# Patient Record
Sex: FEMALE | Race: WHITE | NOT HISPANIC OR LATINO | Employment: STUDENT | URBAN - METROPOLITAN AREA
[De-identification: names, ages, dates, MRNs, and addresses within clinical notes are randomized per-mention and may not be internally consistent; named-entity substitution may affect disease eponyms.]

---

## 2018-10-18 ENCOUNTER — HOSPITAL ENCOUNTER (EMERGENCY)
Facility: HOSPITAL | Age: 23
Discharge: HOME | End: 2018-10-18
Attending: EMERGENCY MEDICINE
Payer: COMMERCIAL

## 2018-10-18 VITALS
SYSTOLIC BLOOD PRESSURE: 119 MMHG | RESPIRATION RATE: 18 BRPM | DIASTOLIC BLOOD PRESSURE: 78 MMHG | TEMPERATURE: 98 F | OXYGEN SATURATION: 96 % | WEIGHT: 139 LBS | HEART RATE: 78 BPM

## 2018-10-18 DIAGNOSIS — H72.91 TYMPANIC MEMBRANE RUPTURE, RIGHT: Primary | ICD-10-CM

## 2018-10-18 PROCEDURE — 99283 EMERGENCY DEPT VISIT LOW MDM: CPT | Mod: ,,, | Performed by: EMERGENCY MEDICINE

## 2018-10-18 PROCEDURE — 99282 EMERGENCY DEPT VISIT SF MDM: CPT

## 2018-10-18 NOTE — DISCHARGE INSTRUCTIONS
Follow-up with ENT doctor in 5-7 days.  Keep a cotton ball in the ear to keep it clean and protect the inner ear.  Do not get water in your ear when showering or bathing. No swimming until further evaluation by your PCP/ENT.  Take ibuprofen/Tylenol as needed for pain. Return to ED immediately if worsening of pain, with bloody or foul smelling drainage from the affected side, fever, headache with nausea vomiting or if you have any concerns.

## 2018-10-18 NOTE — ED PROVIDER NOTES
Encounter Date: 10/18/2018    SCRIBE #1 NOTE: I, Linda Pablo, am scribing for, and in the presence of,  Dr. Cruz . I have scribed the following portions of the note - Other sections scribed: HPI,ROS,PE.       History     Chief Complaint   Patient presents with    Otalgia     Report was trying to clean out right ear adn thinks she popped ear drum. Reports bleeding was noted from ear, none noted noted.      Time patient was seen by the provider: 5:07 PM      The patient is a 22 y.o. female who presents to the ED with a complaint of concern for a ruptured TM to her right ear. Patient states she was trying to clean out her ear with an object 'harder than a Q-tip' approximately 15 minutes ago. Endorses bleeding, otalgia, and muffled hearing.       The history is provided by the patient.     Review of patient's allergies indicates:  Allergies not on file  Past Medical History:   Diagnosis Date    Asthma     Depression      History reviewed. No pertinent surgical history.  History reviewed. No pertinent family history.  Social History     Tobacco Use    Smoking status: Never Smoker    Smokeless tobacco: Never Used   Substance Use Topics    Alcohol use: Not on file    Drug use: Not on file     Review of Systems   Constitutional: Negative for fever.   HENT: Positive for ear pain (bleeding ). Negative for sore throat.         Muffled hearing to right ear   Respiratory: Negative for shortness of breath.    Cardiovascular: Negative for chest pain.   Gastrointestinal: Negative for nausea.   Genitourinary: Negative for dysuria.   Musculoskeletal: Negative for back pain.   Skin: Negative for rash.   Neurological: Negative for weakness.   Hematological: Does not bruise/bleed easily.       Physical Exam     Initial Vitals [10/18/18 1701]   BP Pulse Resp Temp SpO2   119/78 78 18 97.5 °F (36.4 °C) 96 %      MAP       --         Physical Exam    Nursing note and vitals reviewed.  Constitutional: She appears well-developed and  well-nourished. She is not diaphoretic. No distress.   HENT:   Head: Normocephalic and atraumatic.   Right Ear: No drainage.   Mouth/Throat: Oropharynx is clear and moist.   Superficial abrasion with dry blood in canal. Injury to lower bottom half of the TM. No active bleeding at this time.    Eyes: Conjunctivae and EOM are normal. Pupils are equal, round, and reactive to light.   Neck: Normal range of motion. Neck supple. No JVD present.   Cardiovascular: Normal rate, regular rhythm and normal heart sounds.   No murmur heard.  Pulmonary/Chest: Breath sounds normal. No respiratory distress. She has no wheezes. She has no rhonchi. She has no rales.   Abdominal: Soft. She exhibits no distension and no mass. There is no tenderness. There is no rebound and no guarding.   Musculoskeletal: Normal range of motion. She exhibits no edema or tenderness.   Neurological: She is alert and oriented to person, place, and time. She has normal strength.   Skin: Skin is warm and dry. No rash noted.   Psychiatric: Her behavior is normal. Thought content normal.         ED Course   Procedures  Labs Reviewed - No data to display       Imaging Results    None          Medical Decision Making:   ED Management:  22-year-old female with acute traumatic rupture of right tympanic membrane.  No acute infection on exam.  No acute intervention needed at this time.  Will refer patient to outpatient ENT further evaluation in a week.  Patient is stable for discharge            Scribe Attestation:   Scribe #1: I performed the above scribed service and the documentation accurately describes the services I performed. I attest to the accuracy of the note.    Attending Attestation:           Physician Attestation for Scribe:      Comments: I, Dr. Ravi Cruz, personally performed the services described in this documentation. All medical record entries made by the scribe were at my direction and in my presence.  I have reviewed the chart and agree that the  record reflects my personal performance and is accurate and complete. Ravi Cruz 5:31 PM 10/18/2018                 Clinical Impression:   The encounter diagnosis was Tympanic membrane rupture, right.                             Ravi Cruz MD  10/18/18 1730       Ravi Cruz MD  10/18/18 1730

## 2018-11-03 NOTE — ED NOTES
Linda Joshi, a 22 y.o. female presents to the ED via personal transportation with CC right ear.        Patient identifiers verified verbally with patient and correct for Linda Joshi.    LOC/ APPEARANCE: The patient is awake, alert and oriented x 4. Pt is speaking appropriately, no slurred speech. SKIN: Skin is warm dry and intact, and color is consistent with ethnicity. Capillary refill <3 seconds. No breakdown or brusing visible and mucus membranes moist and acyanotic.  : No complaints of frequency, burning, urgency or blood in the urine. No complaints of incontinence.  
CHEST PAIN

## 2019-03-25 ENCOUNTER — HOSPITAL ENCOUNTER (EMERGENCY)
Facility: OTHER | Age: 24
Discharge: PSYCHIATRIC OR SUBSTANCE USE FACILITY | End: 2019-03-25
Attending: EMERGENCY MEDICINE
Payer: COMMERCIAL

## 2019-03-25 VITALS
BODY MASS INDEX: 22.5 KG/M2 | HEART RATE: 94 BPM | TEMPERATURE: 98 F | OXYGEN SATURATION: 97 % | RESPIRATION RATE: 18 BRPM | DIASTOLIC BLOOD PRESSURE: 76 MMHG | SYSTOLIC BLOOD PRESSURE: 119 MMHG | HEIGHT: 66 IN | WEIGHT: 140 LBS

## 2019-03-25 DIAGNOSIS — Z00.8 MEDICAL CLEARANCE FOR PSYCHIATRIC ADMISSION: ICD-10-CM

## 2019-03-25 DIAGNOSIS — T43.222A INTENTIONAL OVERDOSE OF SELECTIVE SEROTONIN REUPTAKE INHIBITOR (SSRI), INITIAL ENCOUNTER: Primary | ICD-10-CM

## 2019-03-25 PROBLEM — F32.A DEPRESSION: Status: ACTIVE | Noted: 2019-03-25

## 2019-03-25 LAB
ALBUMIN SERPL BCP-MCNC: 4.7 G/DL (ref 3.5–5.2)
ALP SERPL-CCNC: 65 U/L (ref 55–135)
ALT SERPL W/O P-5'-P-CCNC: 16 U/L (ref 10–44)
AMPHET+METHAMPHET UR QL: ABNORMAL
ANION GAP SERPL CALC-SCNC: 11 MMOL/L (ref 8–16)
APAP SERPL-MCNC: <3 UG/ML (ref 10–20)
AST SERPL-CCNC: 17 U/L (ref 10–40)
B-HCG UR QL: NEGATIVE
BACTERIA #/AREA URNS HPF: ABNORMAL /HPF
BARBITURATES UR QL SCN>200 NG/ML: NEGATIVE
BASOPHILS # BLD AUTO: 0.05 K/UL (ref 0–0.2)
BASOPHILS NFR BLD: 0.8 % (ref 0–1.9)
BENZODIAZ UR QL SCN>200 NG/ML: NEGATIVE
BILIRUB SERPL-MCNC: 1.1 MG/DL (ref 0.1–1)
BILIRUB UR QL STRIP: ABNORMAL
BUN SERPL-MCNC: 10 MG/DL (ref 6–20)
BZE UR QL SCN: NEGATIVE
CALCIUM SERPL-MCNC: 9.7 MG/DL (ref 8.7–10.5)
CANNABINOIDS UR QL SCN: NEGATIVE
CHLORIDE SERPL-SCNC: 105 MMOL/L (ref 95–110)
CLARITY UR: CLEAR
CO2 SERPL-SCNC: 23 MMOL/L (ref 23–29)
COLOR UR: ABNORMAL
CREAT SERPL-MCNC: 0.9 MG/DL (ref 0.5–1.4)
CREAT UR-MCNC: 352.2 MG/DL (ref 15–325)
CTP QC/QA: YES
DIFFERENTIAL METHOD: NORMAL
EOSINOPHIL # BLD AUTO: 0.1 K/UL (ref 0–0.5)
EOSINOPHIL NFR BLD: 2.4 % (ref 0–8)
ERYTHROCYTE [DISTWIDTH] IN BLOOD BY AUTOMATED COUNT: 12.6 % (ref 11.5–14.5)
EST. GFR  (AFRICAN AMERICAN): >60 ML/MIN/1.73 M^2
EST. GFR  (NON AFRICAN AMERICAN): >60 ML/MIN/1.73 M^2
ETHANOL SERPL-MCNC: <10 MG/DL
GLUCOSE SERPL-MCNC: 94 MG/DL (ref 70–110)
GLUCOSE UR QL STRIP: NEGATIVE
HCT VFR BLD AUTO: 38.1 % (ref 37–48.5)
HGB BLD-MCNC: 13.3 G/DL (ref 12–16)
HGB UR QL STRIP: ABNORMAL
KETONES UR QL STRIP: ABNORMAL
LEUKOCYTE ESTERASE UR QL STRIP: ABNORMAL
LYMPHOCYTES # BLD AUTO: 1.7 K/UL (ref 1–4.8)
LYMPHOCYTES NFR BLD: 28.5 % (ref 18–48)
MCH RBC QN AUTO: 30.8 PG (ref 27–31)
MCHC RBC AUTO-ENTMCNC: 34.9 G/DL (ref 32–36)
MCV RBC AUTO: 88 FL (ref 82–98)
METHADONE UR QL SCN>300 NG/ML: NEGATIVE
MICROSCOPIC COMMENT: ABNORMAL
MONOCYTES # BLD AUTO: 0.5 K/UL (ref 0.3–1)
MONOCYTES NFR BLD: 9.2 % (ref 4–15)
NEUTROPHILS # BLD AUTO: 3.5 K/UL (ref 1.8–7.7)
NEUTROPHILS NFR BLD: 58.9 % (ref 38–73)
NITRITE UR QL STRIP: NEGATIVE
OPIATES UR QL SCN: NEGATIVE
PCP UR QL SCN>25 NG/ML: NEGATIVE
PH UR STRIP: 6 [PH] (ref 5–8)
PLATELET # BLD AUTO: 280 K/UL (ref 150–350)
PMV BLD AUTO: 10.4 FL (ref 9.2–12.9)
POTASSIUM SERPL-SCNC: 3.7 MMOL/L (ref 3.5–5.1)
PROT SERPL-MCNC: 8.2 G/DL (ref 6–8.4)
PROT UR QL STRIP: ABNORMAL
RBC # BLD AUTO: 4.32 M/UL (ref 4–5.4)
RBC #/AREA URNS HPF: 50 /HPF (ref 0–4)
SODIUM SERPL-SCNC: 139 MMOL/L (ref 136–145)
SP GR UR STRIP: >=1.03 (ref 1–1.03)
TOXICOLOGY INFORMATION: ABNORMAL
TSH SERPL DL<=0.005 MIU/L-ACNC: 2.58 UIU/ML (ref 0.4–4)
URN SPEC COLLECT METH UR: ABNORMAL
UROBILINOGEN UR STRIP-ACNC: ABNORMAL EU/DL
WBC # BLD AUTO: 5.9 K/UL (ref 3.9–12.7)
WBC #/AREA URNS HPF: 15 /HPF (ref 0–5)

## 2019-03-25 PROCEDURE — 93010 EKG 12-LEAD: ICD-10-PCS | Mod: ,,, | Performed by: INTERNAL MEDICINE

## 2019-03-25 PROCEDURE — 87086 URINE CULTURE/COLONY COUNT: CPT

## 2019-03-25 PROCEDURE — 93010 ELECTROCARDIOGRAM REPORT: CPT | Mod: ,,, | Performed by: INTERNAL MEDICINE

## 2019-03-25 PROCEDURE — 93005 ELECTROCARDIOGRAM TRACING: CPT

## 2019-03-25 PROCEDURE — 80307 DRUG TEST PRSMV CHEM ANLYZR: CPT

## 2019-03-25 PROCEDURE — 99285 EMERGENCY DEPT VISIT HI MDM: CPT | Mod: 25

## 2019-03-25 PROCEDURE — 80329 ANALGESICS NON-OPIOID 1 OR 2: CPT

## 2019-03-25 PROCEDURE — 80053 COMPREHEN METABOLIC PANEL: CPT

## 2019-03-25 PROCEDURE — 85025 COMPLETE CBC W/AUTO DIFF WBC: CPT

## 2019-03-25 PROCEDURE — 80320 DRUG SCREEN QUANTALCOHOLS: CPT

## 2019-03-25 PROCEDURE — 81025 URINE PREGNANCY TEST: CPT | Performed by: EMERGENCY MEDICINE

## 2019-03-25 PROCEDURE — 81000 URINALYSIS NONAUTO W/SCOPE: CPT | Mod: 59

## 2019-03-25 PROCEDURE — 84443 ASSAY THYROID STIM HORMONE: CPT

## 2019-03-25 RX ORDER — FLUOXETINE HYDROCHLORIDE 40 MG/1
40 CAPSULE ORAL DAILY
Status: ON HOLD | COMMUNITY
End: 2019-03-28 | Stop reason: SDUPTHER

## 2019-03-25 NOTE — ED NOTES
Hourly rounding on patient.  Pt supine in bed AAOx4, calm and cooperative, pt. remains free from injury. RR even and unlabored.  Sitter Floridalma at bedside in direct observation performing q15 checks per psych protocol.  PEC precautions maintained

## 2019-03-25 NOTE — ED NOTES
Pt resting in bed comfortable at this time.  Floridalma at bedside in direct observation. PEC precautions maintained

## 2019-03-25 NOTE — ED NOTES
Hourly rounding on patient.  Pt supine in bed AAOx4, calm and cooperative, pt. remains free from injury. RR even and unlabored.  Deborah Warren at bedside in direct observation performing q15 checks per psych protocol.  PEC precautions maintained

## 2019-03-25 NOTE — ED NOTES
Contact numbers:    Mom(cori) [phone]  Dad(noam) [phone]  Boyfriend (Wesly) [phone]

## 2019-03-25 NOTE — ED TRIAGE NOTES
"Pt arrived at ED stating she swallowed 10 or her 40mg Prozac at 0600 this morning.  She stated "I don't want to die, I was just stressed over a paper that is due today." Pt stated she hasn't sleep in a couple days, pt appears anxious. Pt denies chest pain. Pt denies SI, HI.  No signs of self harm noted RR even and unlabored, NAD noted, bp and pulse q30min. SR x 2, sitter Floridalma at bedside in direct view.  PEC precautions maintained    "

## 2019-03-25 NOTE — ED NOTES
poison control called. Per poison control, pt should be monitored 6-8 hours. Side effects to look for is toxicity seizures, delirium, drowsiness. Provider notified

## 2019-03-25 NOTE — ED NOTES
Pt moved into ED bed 3 undressed and changed into paper scrubs, all harmful objects removed from room, all pt belongs placed in personal belongings bag and brought to nursing station.  ED sitter Floridalma at bedside for direct observation PEC precautions started till further evaluation by provider

## 2019-03-25 NOTE — ED PROVIDER NOTES
"Encounter Date: 3/25/2019    SCRIBE #1 NOTE: I, Sanjana Torres, am scribing for, and in the presence of, Dr. Redmond.       History     Chief Complaint   Patient presents with    Drug Overdose     Pt reports taking 10 40 mg prozaac at 0600 in an effort to harm self. Pt reports she is compliant with her depression meds but has recently felt it needed to be increased due to increased depression and anxiety.     Suicidal     Time seen by provider: 7:28 AM    This is a 23 y.o. female with hx of depression who presents due to SI today. Pt states that she took about 10 40mg Prozac 1.5 hours ago. Pt states "I wasn't really thinking" and "I really don't want to die, I thought I did for a second." Pt admits to having had SI in the past, but felt that she would never act on her thoughts. She is currently in law school, , and states "It's been really bad since law school. I'm not taking care of myself." Pt states that she slept about 4 hours in the past 48 hours as she has been working on a paper for school. She states that the deadline was this morning, which was increasingly making her anxious today. Of note, pt states that she has only felt this badly once in the past and that was during undergrad. She states that at that time, she did not have any suicide attempts but was talking "crazy" and states "I was asking my mom to kill me." Pt states that she had a therapist in [place], [place] during college, but does not have one here. She states that her medications are managed by her PCP in [facility]. Pt is prescribed 40mg Prozac QD, and reports complaince. She states that her dose was increased three months ago and another time earlier last year. Pt states that after taking the pills, she called her mother and aunt. Pt lives with her boyfriend. She reports no HI or AH/VH. She denies any fever, chills, N/V, abdominal pain, chest pain, palpitations, dizziness, weakness, lightheadedness, LOC, visual changes, " confusion, and SOB.    The history is provided by the patient.     Review of patient's allergies indicates:  No Known Allergies  Past Medical History:   Diagnosis Date    Asthma     Depression      History reviewed. No pertinent surgical history.  History reviewed. No pertinent family history.  Social History     Tobacco Use    Smoking status: Never Smoker    Smokeless tobacco: Never Used   Substance Use Topics    Alcohol use: Not on file    Drug use: Not on file     Review of Systems   Constitutional: Negative for chills and fever.   HENT: Negative for congestion, rhinorrhea and sore throat.    Eyes: Negative for visual disturbance.   Respiratory: Negative for cough and shortness of breath.    Cardiovascular: Negative for chest pain.   Gastrointestinal: Negative for abdominal pain, diarrhea, nausea and vomiting.   Endocrine: Negative for polyuria.   Genitourinary: Negative for decreased urine volume and dysuria.   Musculoskeletal: Negative for back pain.   Skin: Negative for rash.   Allergic/Immunologic: Negative for immunocompromised state.   Neurological: Negative for dizziness, weakness, light-headedness, numbness and headaches.   Hematological: Does not bruise/bleed easily.   Psychiatric/Behavioral: Positive for suicidal ideas. Negative for confusion and hallucinations.        Negative for HI.       Physical Exam     Initial Vitals [03/25/19 0715]   BP Pulse Resp Temp SpO2   (!) 158/91 104 18 97 °F (36.1 °C) 98 %      MAP       --         Physical Exam    Nursing note and vitals reviewed.  Constitutional: She appears well-developed and well-nourished. She is not diaphoretic. She is cooperative.  Non-toxic appearance. She appears distressed.   HENT:   Head: Normocephalic and atraumatic.   Right Ear: External ear normal.   Left Ear: External ear normal.   Mouth/Throat: Oropharynx is clear and moist.   Eyes: Conjunctivae and EOM are normal. Pupils are equal, round, and reactive to light. Right eye exhibits  no discharge. Left eye exhibits no discharge.   Neck: Normal range of motion and full passive range of motion without pain. Neck supple. No thyromegaly present.   Cardiovascular: Regular rhythm, normal heart sounds and normal pulses. Tachycardia present.    Pulmonary/Chest: Effort normal and breath sounds normal. No respiratory distress.   Abdominal: Soft. Normal appearance and bowel sounds are normal. She exhibits no distension. There is no tenderness.   Musculoskeletal: Normal range of motion.   Neurological: She is alert and oriented to person, place, and time. She has normal strength. No cranial nerve deficit or sensory deficit.   Skin: Skin is warm, dry and intact. No rash noted. No erythema.   Psychiatric: Her speech is normal and behavior is normal. Judgment normal. Her mood appears anxious. She exhibits a depressed mood.   Tearful. Sad affect. Suicidal thoughts.         ED Course   Procedures  Labs Reviewed   COMPREHENSIVE METABOLIC PANEL - Abnormal; Notable for the following components:       Result Value    Total Bilirubin 1.1 (*)     All other components within normal limits   URINALYSIS, REFLEX TO URINE CULTURE - Abnormal; Notable for the following components:    Specific Gravity, UA >=1.030 (*)     Protein, UA Trace (*)     Ketones, UA Trace (*)     Bilirubin (UA) 1+ (*)     Occult Blood UA 3+ (*)     Urobilinogen, UA 2.0-3.0 (*)     Leukocytes, UA Trace (*)     All other components within normal limits    Narrative:     Preferred Collection Type->Urine, Clean Catch   DRUG SCREEN PANEL, URINE EMERGENCY - Abnormal; Notable for the following components:    Creatinine, Random Ur 352.2 (*)     All other components within normal limits    Narrative:     Preferred Collection Type->Urine, Clean Catch   ACETAMINOPHEN LEVEL - Abnormal; Notable for the following components:    Acetaminophen (Tylenol), Serum <3.0 (*)     All other components within normal limits   URINALYSIS MICROSCOPIC - Abnormal; Notable for the  "following components:    RBC, UA 50 (*)     WBC, UA 15 (*)     Bacteria, UA Few (*)     All other components within normal limits    Narrative:     Preferred Collection Type->Urine, Clean Catch   CULTURE, URINE   CBC W/ AUTO DIFFERENTIAL   TSH   ALCOHOL,MEDICAL (ETHANOL)   POCT URINE PREGNANCY     EKG Readings: (Independently Interpreted)   NSR at a rate of 83. Narrow QRS. No STEMI. QTc 433.          Medical Decision Making:   Initial Assessment:   Urgent evaluation a 23-year-old female with history of depression, currently for serial law student here after taking approximately 10 pills of 40 mg Prozac at 6:00 a.m. this morning.  Patient reports having increased anxiety, and limited sleep in last 48 hours, then felt helpless and thoughts of self harm prior to taking pills. Pt reports prior "break down" in college, but no psych hospitalizations, newly moved to [facility] for school and has not established with local psychiatrist here. VS w mild tachycardia, and pt emotional, but low suspicion for serotonin syndrome at this time. Will consult poison control, obtain ekg and reassess.       Independently Interpreted Test(s):   I have ordered and independently interpreted EKG Reading(s) - see prior notes  Clinical Tests:   Lab Tests: Ordered and Reviewed  Medical Tests: Ordered and Reviewed  ED Management:  Patient observed for 6 hr as suggested by poison Control, without agitation or concern for delirium, no suggestion of serotonin syndrome.  Patient has no QTC prolongation, and uncle at bedside made aware of plan for psych transfer. + amphetamine on drug screen.  Pt medically cleared for such.             Scribe Attestation:   Scribe #1: I performed the above scribed service and the documentation accurately describes the services I performed. I attest to the accuracy of the note.    Attending Attestation:           Physician Attestation for Scribe:  Physician Attestation Statement for Scribe #1: I, Dr. Redmond, reviewed " documentation, as scribed by Sanjana Torres in my presence, and it is both accurate and complete.                    Clinical Impression:     1. Intentional overdose of selective serotonin reuptake inhibitor (SSRI), initial encounter    2. Medical clearance for psychiatric admission          Disposition:   Disposition: Transferred  Condition: Critical                        Edna Redmond MD  03/25/19 4304

## 2019-03-25 NOTE — ED NOTES
Pt belongings: tan shoes, grey sweatshirt, black pants, green shirt, brown wallet, $20 bill, 2/$1 bills, various cards, various papers, license, multiple credit cards, 5 pennies, turquoise necklace.  Security called to  belongings

## 2019-03-25 NOTE — ED NOTES
Admit packet faxed to [facility], [facility], [facility], and [facility]. Waiting for response.

## 2019-03-25 NOTE — ED NOTES
Gabrielle intake nurse from [facility] called to get this patient placed, she had a few questions.

## 2019-03-25 NOTE — ED NOTES
Patient accepted by Maribel at [facility] ([address] [place] [address]) for the service of Dr. Kwan.  Report to be called to [phone].      Request made to call report 20 minutes from this note.

## 2019-03-25 NOTE — ED NOTES
Pt lying calmly in bed.  Sitter Floridalma at bedside in direct observation.  PEC precautions maintained

## 2019-03-25 NOTE — ED NOTES
Hourly rounding on patient.  Pt supine in bed eating lunch. AAOx4, calm and cooperative, pt. remains free from injury. RR even and unlabored.  Sitter Floridalma at bedside in direct observation performing q15 checks per psych protocol.  PEC precautions maintained

## 2019-03-26 LAB — BACTERIA UR CULT: NORMAL

## 2019-03-28 PROBLEM — F33.1 MODERATE EPISODE OF RECURRENT MAJOR DEPRESSIVE DISORDER: Status: ACTIVE | Noted: 2019-03-28

## 2019-03-28 PROBLEM — F32.A DEPRESSION: Status: RESOLVED | Noted: 2019-03-25 | Resolved: 2019-03-28

## 2019-03-29 PROBLEM — F33.2 SEVERE EPISODE OF RECURRENT MAJOR DEPRESSIVE DISORDER: Status: ACTIVE | Noted: 2019-03-29

## 2020-07-14 ENCOUNTER — OFFICE VISIT (OUTPATIENT)
Dept: URGENT CARE | Facility: CLINIC | Age: 25
End: 2020-07-14
Payer: COMMERCIAL

## 2020-07-14 VITALS
OXYGEN SATURATION: 98 % | TEMPERATURE: 99 F | SYSTOLIC BLOOD PRESSURE: 125 MMHG | HEART RATE: 97 BPM | DIASTOLIC BLOOD PRESSURE: 70 MMHG

## 2020-07-14 DIAGNOSIS — N89.8 VAGINAL DISCHARGE: Primary | ICD-10-CM

## 2020-07-14 LAB
B-HCG UR QL: NEGATIVE
BILIRUB UR QL STRIP: NEGATIVE
CTP QC/QA: YES
GLUCOSE UR QL STRIP: NEGATIVE
KETONES UR QL STRIP: NEGATIVE
LEUKOCYTE ESTERASE UR QL STRIP: NEGATIVE
PH, POC UA: 6 (ref 5–8)
POC BLOOD, URINE: NEGATIVE
POC NITRATES, URINE: NEGATIVE
PROT UR QL STRIP: NEGATIVE
SP GR UR STRIP: 1.01 (ref 1–1.03)
UROBILINOGEN UR STRIP-ACNC: NORMAL (ref 0.1–1.1)

## 2020-07-14 PROCEDURE — 99203 PR OFFICE/OUTPT VISIT, NEW, LEVL III, 30-44 MIN: ICD-10-PCS | Mod: 25,S$GLB,, | Performed by: FAMILY MEDICINE

## 2020-07-14 PROCEDURE — 81003 URINALYSIS AUTO W/O SCOPE: CPT | Mod: QW,S$GLB,, | Performed by: FAMILY MEDICINE

## 2020-07-14 PROCEDURE — 87510 GARDNER VAG DNA DIR PROBE: CPT

## 2020-07-14 PROCEDURE — 99203 OFFICE O/P NEW LOW 30 MIN: CPT | Mod: 25,S$GLB,, | Performed by: FAMILY MEDICINE

## 2020-07-14 PROCEDURE — 87480 CANDIDA DNA DIR PROBE: CPT

## 2020-07-14 PROCEDURE — 87491 CHLMYD TRACH DNA AMP PROBE: CPT

## 2020-07-14 PROCEDURE — 81003 POCT URINALYSIS, DIPSTICK, AUTOMATED, W/O SCOPE: ICD-10-PCS | Mod: QW,S$GLB,, | Performed by: FAMILY MEDICINE

## 2020-07-14 RX ORDER — METRONIDAZOLE 500 MG/1
500 TABLET ORAL 2 TIMES DAILY
Qty: 14 TABLET | Refills: 0 | Status: SHIPPED | OUTPATIENT
Start: 2020-07-14 | End: 2020-07-21

## 2020-07-14 RX ORDER — FLUCONAZOLE 150 MG/1
TABLET ORAL
Qty: 2 TABLET | Refills: 0 | Status: SHIPPED | OUTPATIENT
Start: 2020-07-14

## 2020-07-14 NOTE — PROGRESS NOTES
Subjective:       Patient ID: Linda Joshi is a 24 y.o. female.    Vitals:  temperature is 99.2 °F (37.3 °C). Her blood pressure is 125/70 and her pulse is 97. Her oxygen saturation is 98%.     Chief Complaint: Urinary Tract Infection    24-year-old female with complaints of vaginal discharge for 1 week.  Monogamous, and uses condoms 100%.  Initially with some itching.  Also with malodor.    Urinary Tract Infection   This is a new problem. The current episode started 1 to 4 weeks ago. The problem has been unchanged. Pertinent negatives include no chills, frequency, hematuria, nausea, urgency, vomiting or rash. She has tried nothing for the symptoms. The treatment provided no relief.       Constitution: Negative for chills and fever.   Neck: Negative for painful lymph nodes.   Gastrointestinal: Negative for abdominal pain, nausea and vomiting.   Genitourinary: Positive for vaginal discharge and vaginal odor. Negative for dysuria, frequency, urgency, urine decreased, hematuria, history of kidney stones, painful menstruation, irregular menstruation, missed menses, heavy menstrual bleeding, ovarian cysts, genital trauma, vaginal pain, vaginal bleeding, painful intercourse, genital sore, painful ejaculation and pelvic pain.   Musculoskeletal: Negative for back pain.   Skin: Negative for rash and lesion.   Hematologic/Lymphatic: Negative for swollen lymph nodes.       Objective:      Physical Exam   Constitutional: She is oriented to person, place, and time. She appears well-developed.   HENT:   Head: Normocephalic and atraumatic.   Ears:   Right Ear: External ear normal.   Left Ear: External ear normal.   Mouth/Throat: Mucous membranes are normal.   Eyes: Lids are normal.   Neck: Trachea normal and full passive range of motion without pain. Neck supple.   Cardiovascular: Normal rate, regular rhythm and normal heart sounds.   Pulmonary/Chest: Effort normal and breath sounds normal. No stridor. No respiratory distress.  She has no wheezes. She has no rhonchi. She has no rales.   Abdominal: Soft. Normal appearance and bowel sounds are normal. She exhibits no distension, no abdominal bruit, no pulsatile midline mass and no mass. There is no abdominal tenderness. There is no rebound and no guarding. No hernia.   Musculoskeletal: Normal range of motion.   Neurological: She is alert and oriented to person, place, and time. She has normal strength.   Skin: Skin is warm, dry, intact, not diaphoretic and not pale. Psychiatric: Her speech is normal and behavior is normal. Judgment and thought content normal.   Nursing note and vitals reviewed.        Results for orders placed or performed in visit on 07/14/20   POCT Urinalysis, Dipstick, Automated, W/O Scope   Result Value Ref Range    POC Blood, Urine Negative Negative    POC Bilirubin, Urine Negative Negative    POC Urobilinogen, Urine norm 0.1 - 1.1    POC Ketones, Urine Negative Negative    POC Protein, Urine Negative Negative    POC Nitrates, Urine Negative Negative    POC Glucose, Urine Negative Negative    pH, UA 6.0 5 - 8    POC Specific Gravity, Urine 1.015 1.003 - 1.029    POC Leukocytes, Urine Negative Negative     Assessment:       1. Vaginal discharge      -  discussed treatment for yeast vaginitis and BV.  Plan:         Vaginal discharge  -     POCT Urinalysis, Dipstick, Automated, W/O Scope  -     C. trachomatis/N. gonorrhoeae by AMP DNA [place]; Urine  -     Bv, Trich, Candida by DNA Probe (Swab Only)  -     POCT urine pregnancy  -     metroNIDAZOLE (FLAGYL) 500 MG tablet; Take 1 tablet (500 mg total) by mouth 2 (two) times daily. for 7 days  Dispense: 14 tablet; Refill: 0  -     fluconazole (DIFLUCAN) 150 MG Tab; One by mouth as a single dose.  Repeat in 3 days if still with symptoms  Dispense: 2 tablet; Refill: 0    WE TREATED YOU TODAY FOR BACTERIAL VAGINOSIS AND YEAST VAGINITIS.      BE AWARE THAT YOU CANNOT DRINK ALCOHOL WHILE YOU'RE ON THE FLAGYL (METRONIDAZOLE)  ANTIBIOTIC, OR WITHIN 3 DAYS OF FINISHING THIS ANTIBIOTIC.    WE WILL CALL YOU IN SEVERAL DAYS WITH RESULTS OF TESTING DONE TODAY.

## 2020-07-14 NOTE — PATIENT INSTRUCTIONS
Preventing Vaginitis     Use mild, unscented soap when you bathe or shower to avoid irritating your vagina.    Vaginitis is irritation or infection of the vagina or vulva (the outside opening of the vagina). Vaginitis can be caused by bacteria, viruses, parasites, or yeast. Chemicals (such as in perfumes or soaps or in spermicides) can sometimes be a cause. Vaginitis can be caused by hormone changes in pregnancy or with menopause. You can help prevent vaginitis. Follow the tips below. And see your healthcare provider if you have any symptoms.  Hygiene  · Avoid chemicals. Do not use vaginal sprays. Do not use scented toilet paper or tampons that are scented. Sprays and scents have chemicals that can irritate your vagina.  · Do not douche unless you are told to by your healthcare provider. Douching is rarely needed. And it upsets the normal balance in the vagina.  · Wash yourself well. Wash the outer vaginal area (vulva) every day with mild, unscented soap. Keep it as dry as possible.  · Wipe correctly. Make sure to wipe from front to back after a bowel movement. This helps keep from spreading bacteria from your anus to your vagina.  · Change your tampon often. During your period, make sure to change your tampon as often as directed on the package. This allows the normal flow of vaginal discharge and blood.  Lifestyle  · Limit your number of sexual partners. The more partners you have, the greater your risk of infection. Using condoms helps reduce your risk.  · Get enough sleep. Sleep helps keep your bodys immune system healthy. This helps you fight infection.  · Lose weight, if needed. Excess weight can reduce air circulation around your vagina. This can increase your risk of infection.  · Exercise regularly. Regular activity helps keep your body healthy.  · Take antibiotics only as directed. Antibiotics can change the normal chemical balance in the vagina.    Clothing  · Dont sit in wet clothes. Yeast thrives  when its warm and damp.  · Dont wear tight pants. And dont wear tights, leggings, or hose without a cotton crotch. These types of clothing trap warmth and moisture.  · Wear cotton underwear. Cotton lets air circulate around the vagina.  Symptoms of vaginitis  · Irritation, swelling, or itching of the genital area  · Vaginal discharge  · Bad vaginal odor  · Pain or burning during urination   Date Last Reviewed: 12/1/2016 © 2000-2017 [employer]. [address] [place] [address]. All rights reserved. This information is not intended as a substitute for professional medical care. Always follow your healthcare professional's instructions.      WE TREATED YOU TODAY FOR BACTERIAL VAGINOSIS AND YEAST VAGINITIS.      BE AWARE THAT YOU CANNOT DRINK ALCOHOL WHILE YOU'RE ON THE FLAGYL (METRONIDAZOLE) ANTIBIOTIC, OR WITHIN 3 DAYS OF FINISHING THIS ANTIBIOTIC.    WE WILL CALL YOU IN SEVERAL DAYS WITH RESULTS OF TESTING DONE TODAY.

## 2020-07-16 LAB
C TRACH DNA SPEC QL NAA+PROBE: NOT DETECTED
N GONORRHOEA DNA SPEC QL NAA+PROBE: NOT DETECTED

## 2020-07-17 ENCOUNTER — TELEPHONE (OUTPATIENT)
Dept: URGENT CARE | Facility: CLINIC | Age: 25
End: 2020-07-17

## 2020-07-17 NOTE — TELEPHONE ENCOUNTER
Called to discuss negative GC on 7/14/2020. Vaginal swab pending. Patient did not answer the phone. VM was left for her to contact clinic. She does not have my chart. During previous visit, she was treated with flaygl and fluconazole.

## 2020-07-18 LAB
CANDIDA RRNA VAG QL PROBE: NOT DETECTED
G VAGINALIS RRNA GENITAL QL PROBE: DETECTED
T VAGINALIS RRNA GENITAL QL PROBE: NOT DETECTED

## 2020-07-19 ENCOUNTER — TELEPHONE (OUTPATIENT)
Dept: URGENT CARE | Facility: CLINIC | Age: 25
End: 2020-07-19

## 2020-07-19 NOTE — TELEPHONE ENCOUNTER
7/19 attempted to call patient with lab results, no answer, lvm to call clinic. Please review vaginal swab results with patient when she calls back